# Patient Record
Sex: MALE | Race: WHITE | NOT HISPANIC OR LATINO | Employment: OTHER | URBAN - METROPOLITAN AREA
[De-identification: names, ages, dates, MRNs, and addresses within clinical notes are randomized per-mention and may not be internally consistent; named-entity substitution may affect disease eponyms.]

---

## 2019-04-02 ENCOUNTER — TELEPHONE (OUTPATIENT)
Dept: PRIMARY CARE CLINIC | Facility: CLINIC | Age: 74
End: 2019-04-02

## 2019-04-02 NOTE — TELEPHONE ENCOUNTER
----- Message from Jacquelin Weber sent at 3/28/2019 11:16 AM CDT -----  Contact: Scarlett (wife)  Pt is requesting steroid pack, Shivani cough pills, and z-pac sent to [facility] in [place] [phone] is Scarlett's phone number

## 2019-04-02 NOTE — TELEPHONE ENCOUNTER
I am just now receiving this message.  Please call him and see if he went to an urgent care or if his symptoms improved.  Please apologize and let them know that the message was taken but was lost in the system.  If he is still having symptoms I can call out to medication, but if he is getting better then he does not need to take a medication and it would be better if he did not take it.

## 2019-04-25 ENCOUNTER — TELEPHONE (OUTPATIENT)
Dept: PRIMARY CARE CLINIC | Facility: CLINIC | Age: 74
End: 2019-04-25

## 2019-04-25 DIAGNOSIS — I10 ESSENTIAL HYPERTENSION: Primary | ICD-10-CM

## 2019-04-25 RX ORDER — LOSARTAN POTASSIUM AND HYDROCHLOROTHIAZIDE 25; 100 MG/1; MG/1
TABLET ORAL
COMMUNITY
End: 2019-04-25 | Stop reason: SDUPTHER

## 2019-04-25 RX ORDER — LOSARTAN POTASSIUM AND HYDROCHLOROTHIAZIDE 25; 100 MG/1; MG/1
TABLET ORAL
Qty: 90 TABLET | Refills: 3 | Status: SHIPPED | OUTPATIENT
Start: 2019-04-25

## 2019-04-29 ENCOUNTER — OFFICE VISIT (OUTPATIENT)
Dept: PRIMARY CARE CLINIC | Facility: CLINIC | Age: 74
End: 2019-04-29
Payer: MEDICARE

## 2019-04-29 VITALS
HEIGHT: 71 IN | OXYGEN SATURATION: 97 % | BODY MASS INDEX: 33.04 KG/M2 | WEIGHT: 236 LBS | HEART RATE: 71 BPM | RESPIRATION RATE: 16 BRPM | SYSTOLIC BLOOD PRESSURE: 120 MMHG | DIASTOLIC BLOOD PRESSURE: 70 MMHG

## 2019-04-29 DIAGNOSIS — E78.2 MIXED HYPERLIPIDEMIA: ICD-10-CM

## 2019-04-29 DIAGNOSIS — R05.9 COUGH: ICD-10-CM

## 2019-04-29 DIAGNOSIS — K21.9 GASTROESOPHAGEAL REFLUX DISEASE, ESOPHAGITIS PRESENCE NOT SPECIFIED: ICD-10-CM

## 2019-04-29 DIAGNOSIS — I10 BENIGN ESSENTIAL HYPERTENSION: Primary | ICD-10-CM

## 2019-04-29 PROBLEM — C44.320 SQUAMOUS CELL CARCINOMA OF SKIN OF FACE: Status: ACTIVE | Noted: 2019-04-29

## 2019-04-29 PROBLEM — N52.9 ED (ERECTILE DYSFUNCTION) OF ORGANIC ORIGIN: Status: ACTIVE | Noted: 2019-04-29

## 2019-04-29 PROBLEM — R42 VERTIGO: Status: ACTIVE | Noted: 2019-04-29

## 2019-04-29 PROBLEM — F41.1 ANXIETY STATE: Status: ACTIVE | Noted: 2019-04-29

## 2019-04-29 PROBLEM — A04.8 HELICOBACTER PYLORI GASTROINTESTINAL TRACT INFECTION: Status: ACTIVE | Noted: 2019-04-29

## 2019-04-29 PROBLEM — Z95.0 CARDIAC PACEMAKER IN SITU: Status: ACTIVE | Noted: 2019-04-29

## 2019-04-29 PROBLEM — I11.9 HYPERTENSIVE HEART DISEASE WITHOUT CONGESTIVE HEART FAILURE: Status: ACTIVE | Noted: 2019-04-29

## 2019-04-29 PROBLEM — E29.1 TESTICULAR HYPOFUNCTION: Status: ACTIVE | Noted: 2019-04-29

## 2019-04-29 PROBLEM — K57.30 DIVERTICULAR DISEASE OF COLON: Status: ACTIVE | Noted: 2019-04-29

## 2019-04-29 PROCEDURE — 99214 PR OFFICE/OUTPT VISIT, EST, LEVL IV, 30-39 MIN: ICD-10-PCS | Mod: S$GLB,,, | Performed by: FAMILY MEDICINE

## 2019-04-29 PROCEDURE — 99214 OFFICE O/P EST MOD 30 MIN: CPT | Mod: S$GLB,,, | Performed by: FAMILY MEDICINE

## 2019-04-29 RX ORDER — DIPHENOXYLATE HYDROCHLORIDE AND ATROPINE SULFATE 2.5; .025 MG/1; MG/1
TABLET ORAL
Refills: 0 | COMMUNITY
Start: 2019-03-11

## 2019-04-29 RX ORDER — METOPROLOL SUCCINATE 200 MG/1
TABLET, EXTENDED RELEASE ORAL
COMMUNITY

## 2019-04-29 RX ORDER — APIXABAN 5 MG/1
TABLET, FILM COATED ORAL
COMMUNITY
Start: 2019-04-28 | End: 2022-02-24

## 2019-04-29 RX ORDER — ALBUTEROL SULFATE 90 UG/1
AEROSOL, METERED RESPIRATORY (INHALATION)
COMMUNITY

## 2019-04-29 RX ORDER — LEVOFLOXACIN 500 MG/1
TABLET, FILM COATED ORAL
COMMUNITY
Start: 2019-04-09 | End: 2020-03-04

## 2019-04-29 RX ORDER — ASPIRIN 81 MG/1
81 TABLET ORAL DAILY
COMMUNITY

## 2019-04-29 RX ORDER — LABETALOL 200 MG/1
400 TABLET, FILM COATED ORAL 2 TIMES DAILY
Refills: 5 | COMMUNITY
Start: 2019-04-07

## 2019-04-29 RX ORDER — IPRATROPIUM BROMIDE 42 UG/1
SPRAY, METERED NASAL
COMMUNITY

## 2019-04-29 RX ORDER — AZELASTINE 1 MG/ML
SPRAY, METERED NASAL
COMMUNITY

## 2019-04-29 RX ORDER — GABAPENTIN 600 MG/1
TABLET ORAL
COMMUNITY

## 2019-04-29 RX ORDER — CITALOPRAM 10 MG/1
TABLET ORAL
COMMUNITY
Start: 2019-04-01 | End: 2022-02-24

## 2019-04-29 RX ORDER — CLONIDINE HYDROCHLORIDE 0.1 MG/1
TABLET ORAL
Refills: 0 | COMMUNITY
Start: 2019-04-07

## 2019-04-29 RX ORDER — CITALOPRAM 10 MG/1
10 TABLET ORAL DAILY
COMMUNITY
End: 2022-02-24 | Stop reason: SDUPTHER

## 2019-04-29 RX ORDER — BUDESONIDE AND FORMOTEROL FUMARATE DIHYDRATE 160; 4.5 UG/1; UG/1
AEROSOL RESPIRATORY (INHALATION)
COMMUNITY

## 2019-04-29 RX ORDER — AMLODIPINE BESYLATE 5 MG/1
TABLET ORAL
COMMUNITY
Start: 2019-02-17

## 2019-04-29 NOTE — PROGRESS NOTES
"Subjective:       Patient ID: Heather Andrea is a 73 y.o. male.    Chief Complaint: Hospital Follow Up (He went to [facility] for Hypertension) and cough (States he is still coughing when he lays down. He was given levaquin a " few weeks ago" to help with this .He said this has helped. )    Patient presents for follow-up on chronic conditions.  History of hypertension, which had been  well controlled on medications without side effects until a month ago.  Cardio changed his losartan to something else. He went to er and admitted. Put him labetalol 200mg bid, but this caused orthostatic hypotension.  This has now been halved.  They also found him in a. fib for 3.5 hours and he was placed on eliquis.  He denies any se's, but is concerned about taking this med.    Also with history of mixed hyperlipidemia.  Well controlled on medication without side effects.  Also with history of GERD.  This has been well controlled on medications without side effects as well.        Review of Systems   Constitutional: Negative for chills, fatigue, fever and unexpected weight change.   HENT: Positive for congestion and postnasal drip.    Eyes: Negative for visual disturbance.   Respiratory: Positive for cough. Negative for shortness of breath.    Cardiovascular: Negative for chest pain, palpitations and leg swelling.   Gastrointestinal: Negative for abdominal pain, blood in stool, constipation, diarrhea, nausea and vomiting.   Genitourinary: Negative for dysuria and hematuria.   Musculoskeletal: Negative for arthralgias and back pain.   Skin: Negative for rash and wound.   Neurological: Negative for dizziness, tremors, syncope, weakness, light-headedness, numbness and headaches.   Hematological: Negative for adenopathy. Does not bruise/bleed easily.   Psychiatric/Behavioral: Negative for dysphoric mood. The patient is not nervous/anxious.        Objective:      Physical Exam   Constitutional: He appears well-developed and " well-nourished.   HENT:   Head: Normocephalic and atraumatic.   Bilateral TMs are normal.  There is postnasal drip.  Nose is edematous with clear discharge.   Eyes: Conjunctivae and EOM are normal.   Neck: Neck supple. No thyromegaly present.   Cardiovascular: Normal rate, regular rhythm, normal heart sounds and intact distal pulses.   No murmur heard.  Pulmonary/Chest: Effort normal and breath sounds normal.   Abdominal: Soft. Bowel sounds are normal. He exhibits no mass. There is no tenderness. There is no rebound and no guarding.   Musculoskeletal: Normal range of motion.   Neurological: He is alert.   Skin: Skin is dry.   Psychiatric: He has a normal mood and affect. His behavior is normal.   Vitals reviewed.      Assessment:       1. Benign essential hypertension    2. Mixed hyperlipidemia    3. Gastroesophageal reflux disease, esophagitis presence not specified    4. Cough        Plan:       Benign essential hypertension    Mixed hyperlipidemia    Gastroesophageal reflux disease, esophagitis presence not specified    Cough              DISCUSSION:  Cont meds.  Monitor bp.  Start singulair, claritin, and flonase.

## 2019-09-25 ENCOUNTER — OFFICE VISIT (OUTPATIENT)
Dept: PRIMARY CARE CLINIC | Facility: CLINIC | Age: 74
End: 2019-09-25
Payer: MEDICARE

## 2019-09-25 VITALS
BODY MASS INDEX: 33.04 KG/M2 | HEIGHT: 71 IN | RESPIRATION RATE: 14 BRPM | WEIGHT: 236 LBS | DIASTOLIC BLOOD PRESSURE: 76 MMHG | SYSTOLIC BLOOD PRESSURE: 110 MMHG | OXYGEN SATURATION: 95 % | HEART RATE: 67 BPM

## 2019-09-25 DIAGNOSIS — J44.9 CHRONIC OBSTRUCTIVE PULMONARY DISEASE, UNSPECIFIED COPD TYPE: ICD-10-CM

## 2019-09-25 DIAGNOSIS — J40 BRONCHITIS: Primary | ICD-10-CM

## 2019-09-25 PROCEDURE — 99214 OFFICE O/P EST MOD 30 MIN: CPT | Mod: S$GLB,,, | Performed by: FAMILY MEDICINE

## 2019-09-25 PROCEDURE — 99214 PR OFFICE/OUTPT VISIT, EST, LEVL IV, 30-39 MIN: ICD-10-PCS | Mod: S$GLB,,, | Performed by: FAMILY MEDICINE

## 2019-09-25 RX ORDER — LEVOFLOXACIN 500 MG/1
500 TABLET, FILM COATED ORAL DAILY
Qty: 10 TABLET | Refills: 0 | Status: SHIPPED | OUTPATIENT
Start: 2019-09-25 | End: 2019-09-25

## 2019-09-25 RX ORDER — ALBUTEROL SULFATE 90 UG/1
2 AEROSOL, METERED RESPIRATORY (INHALATION) EVERY 6 HOURS PRN
Qty: 1 INHALER | Refills: 1 | Status: SHIPPED | OUTPATIENT
Start: 2019-09-25

## 2019-09-25 RX ORDER — ALBUTEROL SULFATE 90 UG/1
2 AEROSOL, METERED RESPIRATORY (INHALATION) EVERY 6 HOURS PRN
Qty: 1 INHALER | Refills: 1 | Status: SHIPPED | OUTPATIENT
Start: 2019-09-25 | End: 2019-09-25

## 2019-09-25 RX ORDER — LEVOFLOXACIN 500 MG/1
500 TABLET, FILM COATED ORAL DAILY
Qty: 10 TABLET | Refills: 0 | Status: SHIPPED | OUTPATIENT
Start: 2019-09-25 | End: 2022-02-24

## 2019-09-25 RX ORDER — PROMETHAZINE HYDROCHLORIDE AND CODEINE PHOSPHATE 6.25; 1 MG/5ML; MG/5ML
5 SOLUTION ORAL EVERY 4 HOURS PRN
Qty: 118 ML | Refills: 0 | Status: SHIPPED | OUTPATIENT
Start: 2019-09-25 | End: 2019-10-05

## 2019-09-25 NOTE — PROGRESS NOTES
"Subjective:       Patient ID: Heather Andrea is a 74 y.o. male.    Chief Complaint: Cough (He has been coughing on and off for about two months. It is worse in the afternoons. Productive. " foamy clear to white colour." ); Sinus Problem (He has tried several medications OTC but they have not helped.Denies fever, nausea, or ear pain.  ); and Follow-up    Pt with copd presents with coughing and sinus congestion x 3 months.  It has been on and off but worsened over the last couple of weeks.  He denies any fever and chills.  No ill contacts.  He has been using his inhalers as recommended by his pulmonologist.  There is mild shortness of breath.  Over the last 2-3 weeks he has had much sinus congestion runny nose and his cough has been worse.    Review of Systems   Constitutional: Positive for fatigue. Negative for chills, fever and unexpected weight change.   HENT: Positive for congestion, postnasal drip and rhinorrhea.    Eyes: Negative for visual disturbance.   Respiratory: Positive for cough, shortness of breath and wheezing.    Cardiovascular: Negative for chest pain, palpitations and leg swelling.   Gastrointestinal: Negative for abdominal pain, blood in stool, constipation, diarrhea, nausea and vomiting.   Genitourinary: Negative for dysuria and hematuria.   Musculoskeletal: Negative for arthralgias and back pain.   Skin: Negative for rash and wound.   Neurological: Negative for dizziness, tremors, syncope, weakness, light-headedness, numbness and headaches.   Hematological: Negative for adenopathy. Does not bruise/bleed easily.   Psychiatric/Behavioral: Negative for dysphoric mood. The patient is not nervous/anxious.      Medication List with Changes/Refills   New Medications    ALBUTEROL (PROVENTIL HFA) 90 MCG/ACTUATION INHALER    Inhale 2 puffs into the lungs every 6 (six) hours as needed for Wheezing. Rescue    LEVOFLOXACIN (LEVAQUIN) 500 MG TABLET    Take 1 tablet (500 mg total) by mouth once daily.    " "PROMETHAZINE-CODEINE 6.25-10 MG/5 ML (PHENERGAN WITH CODEINE) 6.25-10 MG/5 ML SYRUP    Take 5 mLs by mouth every 4 (four) hours as needed for Cough.   Current Medications    ALBUTEROL (PROVENTIL/VENTOLIN HFA) 90 MCG/ACTUATION INHALER    ProAir HFA 90 mcg/actuation aerosol inhaler    AMLODIPINE (NORVASC) 5 MG TABLET        APIXABAN (ELIQUIS) 5 MG TAB    Take 5 mg by mouth 2 (two) times daily.    ASPIRIN (ECOTRIN) 81 MG EC TABLET    Take 81 mg by mouth once daily.    AZELASTINE (ASTELIN) 137 MCG (0.1 %) NASAL SPRAY    azelastine 137 mcg (0.1 %) nasal spray aerosol    BUDESONIDE-FORMOTEROL 160-4.5 MCG (SYMBICORT) 160-4.5 MCG/ACTUATION HFAA    Symbicort 160 mcg-4.5 mcg/actuation HFA aerosol inhaler   TAKE 2 PUFFS BY MOUTH TWICE DAILY    CITALOPRAM (CELEXA) 10 MG TABLET        CITALOPRAM (CELEXA) 10 MG TABLET    Take 10 mg by mouth once daily.    CLONIDINE (CATAPRES) 0.1 MG TABLET    TAKE 1 TABLET BY MOUTH EVERY 6 HOURS AS NEEDED FOR BLOOD PRESSURE >165/95    DIPHENOXYLATE-ATROPINE 2.5-0.025 MG (LOMOTIL) 2.5-0.025 MG PER TABLET    TAKE 1 TO 2 TABS BY MOUTH EVERY 6 HOURS AS NEEDED    ELIQUIS 5 MG TAB        GABAPENTIN (NEURONTIN) 600 MG TABLET    gabapentin 300 mg capsule    IPRATROPIUM (ATROVENT) 42 MCG (0.06 %) NASAL SPRAY    ipratropium bromide 42 mcg (0.06 %) nasal spray    LABETALOL (NORMODYNE) 200 MG TABLET    Take 400 mg by mouth 2 (two) times daily.    LEVOFLOXACIN (LEVAQUIN) 500 MG TABLET        LOSARTAN-HYDROCHLOROTHIAZIDE 100-25 MG (HYZAAR) 100-25 MG PER TABLET    1 po qd    METOPROLOL SUCCINATE (TOPROL-XL) 200 MG 24 HR TABLET    metoprolol succinate  mg tablet,extended release 24 hr    OMEGA 3-DHA-EPA-FISH OIL (FISH OIL) 100-160-1,000 MG CAP    Fish Oil   1200mg 2bid    UMECLIDINIUM-VILANTEROL (ANORO ELLIPTA) 62.5-25 MCG/ACTUATION DSDV    Anoro Ellipta 62.5 mcg-25 mcg/actuation powder for inhalation         Objective:      /76   Pulse 67   Resp 14   Ht 5' 11" (1.803 m)   Wt 107 kg (236 lb)   " "SpO2 95%   BMI 32.92 kg/m²   Estimated body mass index is 32.92 kg/m² as calculated from the following:    Height as of this encounter: 5' 11" (1.803 m).    Weight as of this encounter: 107 kg (236 lb).  Physical Exam   Constitutional: He appears well-developed and well-nourished.   HENT:   Head: Normocephalic and atraumatic.   Right Ear: External ear normal.   Left Ear: External ear normal.   Postnasal drip  Bilateral TMs were clear   Eyes: Conjunctivae and EOM are normal.   Neck: Neck supple. No thyromegaly present.   Cardiovascular: Normal rate, regular rhythm, normal heart sounds and intact distal pulses.   No murmur heard.  Pulmonary/Chest: Effort normal. He has wheezes.   Abdominal: Soft. Bowel sounds are normal. He exhibits no mass. There is no tenderness. There is no rebound and no guarding.   Musculoskeletal: Normal range of motion.   Neurological: He is alert.   Skin: Skin is dry.   Psychiatric: He has a normal mood and affect. His behavior is normal.   Vitals reviewed.      Assessment:       Problem List Items Addressed This Visit        Pulmonary    COPD (chronic obstructive pulmonary disease)      Other Visit Diagnoses     Bronchitis    -  Primary    Relevant Medications    levoFLOXacin (LEVAQUIN) 500 MG tablet    albuterol (PROVENTIL HFA) 90 mcg/actuation inhaler    promethazine-codeine 6.25-10 mg/5 ml (PHENERGAN WITH CODEINE) 6.25-10 mg/5 mL syrup    Other Relevant Orders    X-Ray Chest 1 View            Plan:       Bronchitis  -     X-Ray Chest 1 View; Future; Expected date: 09/25/2019  -     levoFLOXacin (LEVAQUIN) 500 MG tablet; Take 1 tablet (500 mg total) by mouth once daily.  Dispense: 10 tablet; Refill: 0  -     albuterol (PROVENTIL HFA) 90 mcg/actuation inhaler; Inhale 2 puffs into the lungs every 6 (six) hours as needed for Wheezing. Rescue  Dispense: 1 Inhaler; Refill: 1  -     promethazine-codeine 6.25-10 mg/5 ml (PHENERGAN WITH CODEINE) 6.25-10 mg/5 mL syrup; Take 5 mLs by mouth every 4 " (four) hours as needed for Cough.  Dispense: 118 mL; Refill: 0    Chronic obstructive pulmonary disease, unspecified COPD type              DISCUSSION:  Get chest x-ray and start Levaquin.  Give her prescription for albuterol to use as a rescue inhaler.  Continue all other inhalers.  Follow up with pulmonology if this is not better within 1 week.  To ER if worsens acutely.

## 2019-09-26 ENCOUNTER — TELEPHONE (OUTPATIENT)
Dept: PRIMARY CARE CLINIC | Facility: CLINIC | Age: 74
End: 2019-09-26

## 2019-09-26 NOTE — TELEPHONE ENCOUNTER
Pt called to advise his Levaquin was sent to the [facility] and it needs to be sent to the [facility] on [address]. He needs to start immediately due to bronchitis.       Please cancel this request. The wife came by and advised they have it at [facility].

## 2019-09-27 ENCOUNTER — TELEPHONE (OUTPATIENT)
Dept: PRIMARY CARE CLINIC | Facility: CLINIC | Age: 74
End: 2019-09-27

## 2019-09-27 NOTE — TELEPHONE ENCOUNTER
----- Message from Meet Isaac MD sent at 9/26/2019 12:03 PM CDT -----  Please let him know that his chest x-ray is normal.

## 2019-09-30 RX ORDER — CITALOPRAM 10 MG/1
TABLET ORAL
Qty: 90 TABLET | Refills: 3 | Status: SHIPPED | OUTPATIENT
Start: 2019-09-30 | End: 2020-09-28

## 2020-03-04 ENCOUNTER — OFFICE VISIT (OUTPATIENT)
Dept: PRIMARY CARE CLINIC | Facility: CLINIC | Age: 75
End: 2020-03-04
Payer: MEDICARE

## 2020-03-04 VITALS
BODY MASS INDEX: 33.74 KG/M2 | HEIGHT: 71 IN | OXYGEN SATURATION: 96 % | RESPIRATION RATE: 16 BRPM | DIASTOLIC BLOOD PRESSURE: 70 MMHG | SYSTOLIC BLOOD PRESSURE: 126 MMHG | WEIGHT: 241 LBS | HEART RATE: 78 BPM

## 2020-03-04 DIAGNOSIS — R05.9 COUGH: ICD-10-CM

## 2020-03-04 DIAGNOSIS — J32.9 SINUSITIS, UNSPECIFIED CHRONICITY, UNSPECIFIED LOCATION: Primary | ICD-10-CM

## 2020-03-04 PROCEDURE — 99214 PR OFFICE/OUTPT VISIT, EST, LEVL IV, 30-39 MIN: ICD-10-PCS | Mod: S$GLB,,, | Performed by: FAMILY MEDICINE

## 2020-03-04 PROCEDURE — 99214 OFFICE O/P EST MOD 30 MIN: CPT | Mod: S$GLB,,, | Performed by: FAMILY MEDICINE

## 2020-03-04 NOTE — PROGRESS NOTES
Subjective:       Patient ID: Heather Andrea is a 74 y.o. male.    Chief Complaint: Sinus Problem    Pt with cough and sinus congestion x 2 months.  Went to  in the beginning.  Given levaquin and cough syrup.  Flu was neg.  Improved for 3 weeks, then returned.  Went back to the .  Given amoxyl.  Flu neg again.  Improved greatly, then returned about 1 week ago.  bloodwork with cardio during this time was good.      Review of Systems   Constitutional: Positive for fatigue. Negative for chills, fever and unexpected weight change.   HENT: Positive for congestion, postnasal drip and sinus pressure.    Eyes: Negative for visual disturbance.   Respiratory: Positive for cough. Negative for shortness of breath.    Cardiovascular: Negative for chest pain, palpitations and leg swelling.   Gastrointestinal: Negative for abdominal pain, blood in stool, constipation, diarrhea, nausea and vomiting.   Genitourinary: Negative for dysuria and hematuria.   Musculoskeletal: Negative for arthralgias and back pain.   Skin: Negative for rash and wound.   Neurological: Negative for dizziness, tremors, syncope, weakness, light-headedness, numbness and headaches.   Hematological: Negative for adenopathy. Does not bruise/bleed easily.   Psychiatric/Behavioral: Negative for dysphoric mood. The patient is not nervous/anxious.      Medication List with Changes/Refills   Current Medications    ALBUTEROL (PROVENTIL/VENTOLIN HFA) 90 MCG/ACTUATION INHALER    ProAir HFA 90 mcg/actuation aerosol inhaler    ALBUTEROL (VENTOLIN HFA) 90 MCG/ACTUATION INHALER    Inhale 2 puffs into the lungs every 6 (six) hours as needed for Wheezing. Rescue    AMLODIPINE (NORVASC) 5 MG TABLET        APIXABAN (ELIQUIS) 5 MG TAB    Take 5 mg by mouth 2 (two) times daily.    ASPIRIN (ECOTRIN) 81 MG EC TABLET    Take 81 mg by mouth once daily.    AZELASTINE (ASTELIN) 137 MCG (0.1 %) NASAL SPRAY    azelastine 137 mcg (0.1 %) nasal spray aerosol    BUDESONIDE-FORMOTEROL  "160-4.5 MCG (SYMBICORT) 160-4.5 MCG/ACTUATION HFAA    Symbicort 160 mcg-4.5 mcg/actuation HFA aerosol inhaler   TAKE 2 PUFFS BY MOUTH TWICE DAILY    CITALOPRAM (CELEXA) 10 MG TABLET        CITALOPRAM (CELEXA) 10 MG TABLET    Take 10 mg by mouth once daily.    CITALOPRAM (CELEXA) 10 MG TABLET    TAKE 1 TABLET BY MOUTH  EVERY DAY    CLONIDINE (CATAPRES) 0.1 MG TABLET    TAKE 1 TABLET BY MOUTH EVERY 6 HOURS AS NEEDED FOR BLOOD PRESSURE >165/95    DIPHENOXYLATE-ATROPINE 2.5-0.025 MG (LOMOTIL) 2.5-0.025 MG PER TABLET    TAKE 1 TO 2 TABS BY MOUTH EVERY 6 HOURS AS NEEDED    ELIQUIS 5 MG TAB        GABAPENTIN (NEURONTIN) 600 MG TABLET    gabapentin 300 mg capsule    IPRATROPIUM (ATROVENT) 42 MCG (0.06 %) NASAL SPRAY    ipratropium bromide 42 mcg (0.06 %) nasal spray    LABETALOL (NORMODYNE) 200 MG TABLET    Take 400 mg by mouth 2 (two) times daily.    LEVOFLOXACIN (LEVAQUIN) 500 MG TABLET    Take 1 tablet (500 mg total) by mouth once daily.    LOSARTAN-HYDROCHLOROTHIAZIDE 100-25 MG (HYZAAR) 100-25 MG PER TABLET    1 po qd    METOPROLOL SUCCINATE (TOPROL-XL) 200 MG 24 HR TABLET    metoprolol succinate  mg tablet,extended release 24 hr    OMEGA 3-DHA-EPA-FISH OIL (FISH OIL) 100-160-1,000 MG CAP    Fish Oil   1200mg 2bid    UMECLIDINIUM-VILANTEROL (ANORO ELLIPTA) 62.5-25 MCG/ACTUATION DSDV    Anoro Ellipta 62.5 mcg-25 mcg/actuation powder for inhalation   Discontinued Medications    LEVOFLOXACIN (LEVAQUIN) 500 MG TABLET             Objective:      /70   Pulse 78   Resp 16   Ht 5' 11" (1.803 m)   Wt 109.3 kg (241 lb)   SpO2 96%   BMI 33.61 kg/m²   Estimated body mass index is 33.61 kg/m² as calculated from the following:    Height as of this encounter: 5' 11" (1.803 m).    Weight as of this encounter: 109.3 kg (241 lb).  Physical Exam   Constitutional: He appears well-developed and well-nourished.   HENT:   Head: Normocephalic and atraumatic.   Right Ear: External ear normal.   Left Ear: External ear normal. "   Postnasal drip and nasal edema.   Eyes: Pupils are equal, round, and reactive to light. Conjunctivae and EOM are normal.   Neck: Neck supple. No thyromegaly present.   Cardiovascular: Normal rate, regular rhythm, normal heart sounds and intact distal pulses.   No murmur heard.  Pulmonary/Chest: Effort normal. No stridor. No respiratory distress. He has no wheezes. He has no rales. He exhibits no tenderness.   Abdominal: Soft. Bowel sounds are normal. He exhibits no mass. There is no tenderness. There is no rebound and no guarding.   Musculoskeletal: Normal range of motion.   Neurological: He is alert.   Skin: Skin is dry.   Psychiatric: He has a normal mood and affect. His behavior is normal.   Vitals reviewed.      Assessment:       Problem List Items Addressed This Visit        Pulmonary    Cough    Relevant Orders    Ambulatory referral/consult to ENT    X-Ray Chest PA And Lateral      Other Visit Diagnoses     Sinusitis, unspecified chronicity, unspecified location    -  Primary    Relevant Orders    Ambulatory referral/consult to ENT            Plan:       Sinusitis, unspecified chronicity, unspecified location  -     Ambulatory referral/consult to ENT; Future; Expected date: 03/11/2020    Cough  -     Ambulatory referral/consult to ENT; Future; Expected date: 03/11/2020  -     X-Ray Chest PA And Lateral; Future; Expected date: 03/04/2020              DISCUSSION:  Get a chest x-ray.  Monitor symptoms.  Rest and fluids.  We will refer to ENT for further evaluation.  This is along period of time and frequent infections.

## 2020-03-05 ENCOUNTER — TELEPHONE (OUTPATIENT)
Dept: PRIMARY CARE CLINIC | Facility: CLINIC | Age: 75
End: 2020-03-05

## 2020-07-06 ENCOUNTER — OFFICE VISIT (OUTPATIENT)
Dept: PRIMARY CARE CLINIC | Facility: CLINIC | Age: 75
End: 2020-07-06
Payer: MEDICARE

## 2020-07-06 VITALS
BODY MASS INDEX: 32.34 KG/M2 | HEIGHT: 71 IN | HEART RATE: 88 BPM | OXYGEN SATURATION: 96 % | WEIGHT: 231 LBS | DIASTOLIC BLOOD PRESSURE: 80 MMHG | TEMPERATURE: 98 F | SYSTOLIC BLOOD PRESSURE: 134 MMHG | RESPIRATION RATE: 18 BRPM

## 2020-07-06 DIAGNOSIS — E78.2 MIXED HYPERLIPIDEMIA: ICD-10-CM

## 2020-07-06 DIAGNOSIS — M54.50 CHRONIC BILATERAL LOW BACK PAIN WITHOUT SCIATICA: ICD-10-CM

## 2020-07-06 DIAGNOSIS — G89.29 CHRONIC BILATERAL LOW BACK PAIN WITHOUT SCIATICA: ICD-10-CM

## 2020-07-06 DIAGNOSIS — I10 BENIGN ESSENTIAL HYPERTENSION: Primary | ICD-10-CM

## 2020-07-06 DIAGNOSIS — J44.9 CHRONIC OBSTRUCTIVE PULMONARY DISEASE, UNSPECIFIED COPD TYPE: ICD-10-CM

## 2020-07-06 DIAGNOSIS — N28.1 RENAL CYST: ICD-10-CM

## 2020-07-06 PROCEDURE — 99214 OFFICE O/P EST MOD 30 MIN: CPT | Mod: S$GLB,,, | Performed by: FAMILY MEDICINE

## 2020-07-06 PROCEDURE — 99214 PR OFFICE/OUTPT VISIT, EST, LEVL IV, 30-39 MIN: ICD-10-PCS | Mod: S$GLB,,, | Performed by: FAMILY MEDICINE

## 2020-07-06 NOTE — PROGRESS NOTES
Subjective:       Patient ID: Heather Andrea is a 74 y.o. male.    Chief Complaint: Follow-up    Patient presents for follow-up on his chronic conditions.  He has history of hypertension which has been well controlled on medications without side effects.  He sees cardiology.  His pacemaker has been good he says.  Also with mixed hyperlipidemia.  He does not take a statin medication for this.  Also with history of COPD.  This has been very stable.  He is on multiple inhalers and they are working well.  He sees pulmonology for this as well.  Also with chronic back pain.  He has gotten 1 epidural steroid injection and it has not helped yet.  He also had an injection for sciatica that helped greatly.  Seeing sanz.  Also with history of a renal cyst.  This was worked up by Urology again a few months ago and was told that it appeared benign.  He was told to follow-up in 1 year.      Review of Systems   Constitutional: Negative for chills, fatigue, fever and unexpected weight change.   Eyes: Negative for visual disturbance.   Respiratory: Negative for shortness of breath.    Cardiovascular: Negative for chest pain, palpitations and leg swelling.   Gastrointestinal: Negative for abdominal pain, blood in stool, constipation, diarrhea, nausea and vomiting.   Genitourinary: Negative for dysuria and hematuria.   Musculoskeletal: Positive for back pain. Negative for arthralgias.   Skin: Negative for rash and wound.   Neurological: Negative for dizziness, tremors, syncope, weakness, light-headedness, numbness and headaches.   Hematological: Negative for adenopathy. Does not bruise/bleed easily.   Psychiatric/Behavioral: Negative for dysphoric mood. The patient is not nervous/anxious.      Medication List with Changes/Refills   Current Medications    ALBUTEROL (PROVENTIL/VENTOLIN HFA) 90 MCG/ACTUATION INHALER    ProAir HFA 90 mcg/actuation aerosol inhaler    ALBUTEROL (VENTOLIN HFA) 90 MCG/ACTUATION INHALER    Inhale 2 puffs  "into the lungs every 6 (six) hours as needed for Wheezing. Rescue    AMLODIPINE (NORVASC) 5 MG TABLET        APIXABAN (ELIQUIS) 5 MG TAB    Take 5 mg by mouth 2 (two) times daily.    ASPIRIN (ECOTRIN) 81 MG EC TABLET    Take 81 mg by mouth once daily.    AZELASTINE (ASTELIN) 137 MCG (0.1 %) NASAL SPRAY    azelastine 137 mcg (0.1 %) nasal spray aerosol    BUDESONIDE-FORMOTEROL 160-4.5 MCG (SYMBICORT) 160-4.5 MCG/ACTUATION HFAA    Symbicort 160 mcg-4.5 mcg/actuation HFA aerosol inhaler   TAKE 2 PUFFS BY MOUTH TWICE DAILY    CITALOPRAM (CELEXA) 10 MG TABLET        CITALOPRAM (CELEXA) 10 MG TABLET    Take 10 mg by mouth once daily.    CITALOPRAM (CELEXA) 10 MG TABLET    TAKE 1 TABLET BY MOUTH  EVERY DAY    CLONIDINE (CATAPRES) 0.1 MG TABLET    TAKE 1 TABLET BY MOUTH EVERY 6 HOURS AS NEEDED FOR BLOOD PRESSURE >165/95    DIPHENOXYLATE-ATROPINE 2.5-0.025 MG (LOMOTIL) 2.5-0.025 MG PER TABLET    TAKE 1 TO 2 TABS BY MOUTH EVERY 6 HOURS AS NEEDED    ELIQUIS 5 MG TAB        GABAPENTIN (NEURONTIN) 600 MG TABLET    gabapentin 300 mg capsule    IPRATROPIUM (ATROVENT) 42 MCG (0.06 %) NASAL SPRAY    ipratropium bromide 42 mcg (0.06 %) nasal spray    LABETALOL (NORMODYNE) 200 MG TABLET    Take 400 mg by mouth 2 (two) times daily.    LEVOFLOXACIN (LEVAQUIN) 500 MG TABLET    Take 1 tablet (500 mg total) by mouth once daily.    LOSARTAN-HYDROCHLOROTHIAZIDE 100-25 MG (HYZAAR) 100-25 MG PER TABLET    1 po qd    METOPROLOL SUCCINATE (TOPROL-XL) 200 MG 24 HR TABLET    metoprolol succinate  mg tablet,extended release 24 hr    OMEGA 3-DHA-EPA-FISH OIL (FISH OIL) 100-160-1,000 MG CAP    Fish Oil   1200mg 2bid    UMECLIDINIUM-VILANTEROL (ANORO ELLIPTA) 62.5-25 MCG/ACTUATION DSDV    Anoro Ellipta 62.5 mcg-25 mcg/actuation powder for inhalation         Objective:      /80 (BP Location: Left arm, Patient Position: Sitting, BP Method: Large (Manual))   Pulse 88   Temp 97.5 °F (36.4 °C)   Resp 18   Ht 5' 11" (1.803 m)   Wt 104.8 kg " "(231 lb)   SpO2 96%   BMI 32.22 kg/m²   Estimated body mass index is 32.22 kg/m² as calculated from the following:    Height as of this encounter: 5' 11" (1.803 m).    Weight as of this encounter: 104.8 kg (231 lb).  Physical Exam  Vitals signs reviewed.   Constitutional:       Appearance: He is well-developed.   HENT:      Head: Normocephalic and atraumatic.   Eyes:      Conjunctiva/sclera: Conjunctivae normal.   Neck:      Musculoskeletal: Neck supple.      Thyroid: No thyromegaly.   Cardiovascular:      Rate and Rhythm: Normal rate and regular rhythm.      Heart sounds: Normal heart sounds. No murmur.   Pulmonary:      Effort: Pulmonary effort is normal.      Breath sounds: Normal breath sounds.   Abdominal:      General: Bowel sounds are normal.      Palpations: Abdomen is soft. There is no mass.      Tenderness: There is no abdominal tenderness. There is no guarding or rebound.   Musculoskeletal: Normal range of motion.   Skin:     General: Skin is dry.   Neurological:      Mental Status: He is alert and oriented to person, place, and time.   Psychiatric:         Mood and Affect: Mood normal.         Behavior: Behavior normal.         Thought Content: Thought content normal.         Judgment: Judgment normal.         Assessment:       Problem List Items Addressed This Visit        Pulmonary    COPD (chronic obstructive pulmonary disease)       Cardiac/Vascular    Benign essential hypertension - Primary    Mixed hyperlipidemia      Other Visit Diagnoses     Chronic bilateral low back pain without sciatica        Renal cyst                Plan:       Benign essential hypertension    Mixed hyperlipidemia    Chronic obstructive pulmonary disease, unspecified COPD type    Chronic bilateral low back pain without sciatica    Renal cyst              DISCUSSION:  Stable.  Labs look good.  Continue medications.  Continue to follow-up with all specialists.  Diet and exercise discussed in detail.    "

## 2021-07-08 ENCOUNTER — PATIENT OUTREACH (OUTPATIENT)
Dept: ADMINISTRATIVE | Facility: HOSPITAL | Age: 76
End: 2021-07-08

## 2021-07-26 PROBLEM — K63.5 COLON POLYPS: Status: ACTIVE | Noted: 2021-06-07

## 2021-08-23 ENCOUNTER — TELEPHONE (OUTPATIENT)
Dept: PRIMARY CARE CLINIC | Facility: CLINIC | Age: 76
End: 2021-08-23

## 2021-08-24 ENCOUNTER — IMMUNIZATION (OUTPATIENT)
Dept: HEMATOLOGY/ONCOLOGY | Facility: CLINIC | Age: 76
End: 2021-08-24
Payer: MEDICARE

## 2021-08-24 DIAGNOSIS — Z23 NEED FOR VACCINATION: Primary | ICD-10-CM

## 2021-08-24 PROCEDURE — 91300 COVID-19, MRNA, LNP-S, PF, 30 MCG/0.3 ML DOSE VACCINE: ICD-10-PCS | Mod: S$GLB,,, | Performed by: FAMILY MEDICINE

## 2021-08-24 PROCEDURE — 0001A COVID-19, MRNA, LNP-S, PF, 30 MCG/0.3 ML DOSE VACCINE: CPT | Mod: CV19,S$GLB,, | Performed by: FAMILY MEDICINE

## 2021-08-24 PROCEDURE — 91300 COVID-19, MRNA, LNP-S, PF, 30 MCG/0.3 ML DOSE VACCINE: CPT | Mod: S$GLB,,, | Performed by: FAMILY MEDICINE

## 2021-08-24 PROCEDURE — 0001A COVID-19, MRNA, LNP-S, PF, 30 MCG/0.3 ML DOSE VACCINE: ICD-10-PCS | Mod: CV19,S$GLB,, | Performed by: FAMILY MEDICINE

## 2021-09-17 ENCOUNTER — IMMUNIZATION (OUTPATIENT)
Dept: HEMATOLOGY/ONCOLOGY | Facility: CLINIC | Age: 76
End: 2021-09-17
Payer: MEDICARE

## 2021-09-17 DIAGNOSIS — Z23 NEED FOR VACCINATION: Primary | ICD-10-CM

## 2021-09-17 PROCEDURE — 0002A COVID-19, MRNA, LNP-S, PF, 30 MCG/0.3 ML DOSE VACCINE: CPT | Mod: CV19,S$GLB,, | Performed by: FAMILY MEDICINE

## 2021-09-17 PROCEDURE — 0002A COVID-19, MRNA, LNP-S, PF, 30 MCG/0.3 ML DOSE VACCINE: ICD-10-PCS | Mod: CV19,S$GLB,, | Performed by: FAMILY MEDICINE

## 2021-09-17 PROCEDURE — 91300 COVID-19, MRNA, LNP-S, PF, 30 MCG/0.3 ML DOSE VACCINE: ICD-10-PCS | Mod: S$GLB,,, | Performed by: FAMILY MEDICINE

## 2021-09-17 PROCEDURE — 91300 COVID-19, MRNA, LNP-S, PF, 30 MCG/0.3 ML DOSE VACCINE: CPT | Mod: S$GLB,,, | Performed by: FAMILY MEDICINE

## 2022-02-14 ENCOUNTER — TELEPHONE (OUTPATIENT)
Dept: PRIMARY CARE CLINIC | Facility: CLINIC | Age: 77
End: 2022-02-14
Payer: MEDICARE

## 2022-02-22 RX ORDER — CITALOPRAM 10 MG/1
10 TABLET ORAL DAILY
Qty: 90 TABLET | Refills: 3 | Status: SHIPPED | OUTPATIENT
Start: 2022-02-22 | End: 2022-02-24

## 2022-02-22 NOTE — TELEPHONE ENCOUNTER
----- Message from Monika Tony sent at 2/22/2022 10:23 AM CST -----  Contact: PT  .Type:  Sooner Appointment Request    Caller is requesting a sooner appointment.  Caller declined first available appointment listed below.  Caller will not accept being placed on the waitlist and is requesting a message be sent to doctor.  Name of Caller: Mrs Romero   When is the first available appointment? 03/14/2022  Symptoms:   Would the patient rather a call back or a response via [facility]?  Callback    Best Call Back Number: .[phone] (home)      Additional Information:

## 2022-02-24 ENCOUNTER — OFFICE VISIT (OUTPATIENT)
Dept: PRIMARY CARE CLINIC | Facility: CLINIC | Age: 77
End: 2022-02-24
Payer: MEDICARE

## 2022-02-24 VITALS
SYSTOLIC BLOOD PRESSURE: 138 MMHG | HEART RATE: 78 BPM | HEIGHT: 71 IN | OXYGEN SATURATION: 97 % | RESPIRATION RATE: 18 BRPM | WEIGHT: 232 LBS | DIASTOLIC BLOOD PRESSURE: 74 MMHG | BODY MASS INDEX: 32.48 KG/M2

## 2022-02-24 DIAGNOSIS — E78.2 MIXED HYPERLIPIDEMIA: ICD-10-CM

## 2022-02-24 DIAGNOSIS — J44.9 CHRONIC OBSTRUCTIVE PULMONARY DISEASE, UNSPECIFIED COPD TYPE: ICD-10-CM

## 2022-02-24 DIAGNOSIS — J45.909 ASTHMA, UNSPECIFIED ASTHMA SEVERITY, UNSPECIFIED WHETHER COMPLICATED, UNSPECIFIED WHETHER PERSISTENT: ICD-10-CM

## 2022-02-24 DIAGNOSIS — G89.29 CHRONIC BILATERAL LOW BACK PAIN WITHOUT SCIATICA: ICD-10-CM

## 2022-02-24 DIAGNOSIS — I10 BENIGN ESSENTIAL HYPERTENSION: Primary | ICD-10-CM

## 2022-02-24 DIAGNOSIS — I48.0 PAROXYSMAL ATRIAL FIBRILLATION: ICD-10-CM

## 2022-02-24 DIAGNOSIS — N28.1 RENAL CYST: ICD-10-CM

## 2022-02-24 DIAGNOSIS — F41.1 ANXIETY STATE: ICD-10-CM

## 2022-02-24 DIAGNOSIS — J32.9 SINUSITIS, UNSPECIFIED CHRONICITY, UNSPECIFIED LOCATION: ICD-10-CM

## 2022-02-24 DIAGNOSIS — M54.50 CHRONIC BILATERAL LOW BACK PAIN WITHOUT SCIATICA: ICD-10-CM

## 2022-02-24 PROCEDURE — 3075F PR MOST RECENT SYSTOLIC BLOOD PRESS GE 130-139MM HG: ICD-10-PCS | Mod: CPTII,S$GLB,, | Performed by: FAMILY MEDICINE

## 2022-02-24 PROCEDURE — 99214 OFFICE O/P EST MOD 30 MIN: CPT | Mod: S$GLB,,, | Performed by: FAMILY MEDICINE

## 2022-02-24 PROCEDURE — 1159F PR MEDICATION LIST DOCUMENTED IN MEDICAL RECORD: ICD-10-PCS | Mod: CPTII,S$GLB,, | Performed by: FAMILY MEDICINE

## 2022-02-24 PROCEDURE — 99214 PR OFFICE/OUTPT VISIT, EST, LEVL IV, 30-39 MIN: ICD-10-PCS | Mod: S$GLB,,, | Performed by: FAMILY MEDICINE

## 2022-02-24 PROCEDURE — 3078F DIAST BP <80 MM HG: CPT | Mod: CPTII,S$GLB,, | Performed by: FAMILY MEDICINE

## 2022-02-24 PROCEDURE — 1160F PR REVIEW ALL MEDS BY PRESCRIBER/CLIN PHARMACIST DOCUMENTED: ICD-10-PCS | Mod: CPTII,S$GLB,, | Performed by: FAMILY MEDICINE

## 2022-02-24 PROCEDURE — 3078F PR MOST RECENT DIASTOLIC BLOOD PRESSURE < 80 MM HG: ICD-10-PCS | Mod: CPTII,S$GLB,, | Performed by: FAMILY MEDICINE

## 2022-02-24 PROCEDURE — 1160F RVW MEDS BY RX/DR IN RCRD: CPT | Mod: CPTII,S$GLB,, | Performed by: FAMILY MEDICINE

## 2022-02-24 PROCEDURE — 1159F MED LIST DOCD IN RCRD: CPT | Mod: CPTII,S$GLB,, | Performed by: FAMILY MEDICINE

## 2022-02-24 PROCEDURE — 3075F SYST BP GE 130 - 139MM HG: CPT | Mod: CPTII,S$GLB,, | Performed by: FAMILY MEDICINE

## 2022-02-24 RX ORDER — CITALOPRAM 10 MG/1
10 TABLET ORAL DAILY
Qty: 90 TABLET | Refills: 3 | Status: SHIPPED | OUTPATIENT
Start: 2022-02-24

## 2022-02-24 RX ORDER — CITALOPRAM 10 MG/1
10 TABLET ORAL DAILY
Qty: 90 TABLET | Refills: 3 | Status: CANCELLED | OUTPATIENT
Start: 2022-02-24

## 2022-02-24 RX ORDER — GABAPENTIN 300 MG/1
600 CAPSULE ORAL NIGHTLY
Qty: 180 CAPSULE | Refills: 3 | Status: SHIPPED | OUTPATIENT
Start: 2022-02-24 | End: 2023-02-24

## 2022-02-24 RX ORDER — GABAPENTIN 600 MG/1
TABLET ORAL
Status: CANCELLED | OUTPATIENT
Start: 2022-02-24

## 2022-02-24 RX ORDER — AMOXICILLIN 500 MG/1
500 TABLET, FILM COATED ORAL 3 TIMES DAILY
Qty: 63 TABLET | Refills: 0 | Status: SHIPPED | OUTPATIENT
Start: 2022-02-24 | End: 2022-03-17

## 2022-02-24 RX ORDER — METHYLPREDNISOLONE 4 MG/1
TABLET ORAL
Qty: 1 EACH | Refills: 0 | Status: SHIPPED | OUTPATIENT
Start: 2022-02-24

## 2022-02-24 NOTE — PROGRESS NOTES
Subjective:       Patient ID: Heather Andrea is a 76 y.o. male.    Chief Complaint: Cough and Sinus Problem    Patient presents for follow-up on his chronic conditions and persistent and recurrent uri.  He goes to , given abx and it improves then returns.  cxr has been normal at the beginning of this with his pulmonologist.  bloodwork was normal at .  This has been for 4 months.  He has history of hypertension which has been well controlled on medications without side effects until recently.  He sees cardiology, last visit was 1 month ago.  His pacemaker has been good he says.  Also with paroxysmal atrial fibrillation.  This has been asymptomatic.  He is still on the Eliquis.  Also with mixed hyperlipidemia.  He does not take a statin medication for this.  Also with history of COPD.  This has been very stable.  He is on multiple inhalers and they are working well.  He sees pulmonology for this as well.  Also with chronic back pain.  He has gotten 1 epidural steroid injection and it has not helped yet.  He also had an injection for sciatica that helped greatly.  Seeing sanz.  Also with history of a renal cyst.  This was worked up by Urology and was told that it appeared benign.       Review of Systems   Constitutional: Negative for chills, fatigue, fever and unexpected weight change.   HENT: Positive for congestion, postnasal drip and sinus pressure.    Eyes: Negative for visual disturbance.   Respiratory: Positive for cough. Negative for shortness of breath.    Cardiovascular: Negative for chest pain, palpitations and leg swelling.   Gastrointestinal: Negative for abdominal pain, blood in stool, constipation, diarrhea, nausea and vomiting.   Genitourinary: Negative for dysuria and hematuria.   Musculoskeletal: Positive for back pain. Negative for arthralgias.   Skin: Negative for rash and wound.   Neurological: Negative for dizziness, tremors, syncope, weakness, light-headedness, numbness and headaches.    Hematological: Negative for adenopathy. Does not bruise/bleed easily.   Psychiatric/Behavioral: Negative for dysphoric mood. The patient is not nervous/anxious.      Medication List with Changes/Refills   New Medications    AMOXICILLIN (AMOXIL) 500 MG TAB    Take 1 tablet (500 mg total) by mouth 3 (three) times daily. for 21 days    GABAPENTIN (NEURONTIN) 300 MG CAPSULE    Take 2 capsules (600 mg total) by mouth every evening.    METHYLPREDNISOLONE (MEDROL DOSEPACK) 4 MG TABLET    use as directed   Current Medications    ALBUTEROL (PROVENTIL/VENTOLIN HFA) 90 MCG/ACTUATION INHALER    ProAir HFA 90 mcg/actuation aerosol inhaler    ALBUTEROL (VENTOLIN HFA) 90 MCG/ACTUATION INHALER    Inhale 2 puffs into the lungs every 6 (six) hours as needed for Wheezing. Rescue    AMLODIPINE (NORVASC) 5 MG TABLET        APIXABAN (ELIQUIS) 5 MG TAB    Take 2.5 mg by mouth 2 (two) times daily.    ASPIRIN (ECOTRIN) 81 MG EC TABLET    Take 81 mg by mouth once daily.    AZELASTINE (ASTELIN) 137 MCG (0.1 %) NASAL SPRAY    azelastine 137 mcg (0.1 %) nasal spray aerosol    BUDESONIDE-FORMOTEROL 160-4.5 MCG (SYMBICORT) 160-4.5 MCG/ACTUATION HFAA    Symbicort 160 mcg-4.5 mcg/actuation HFA aerosol inhaler   TAKE 2 PUFFS BY MOUTH TWICE DAILY    CLONIDINE (CATAPRES) 0.1 MG TABLET    TAKE 1 TABLET BY MOUTH EVERY 6 HOURS AS NEEDED FOR BLOOD PRESSURE >165/95    DIPHENOXYLATE-ATROPINE 2.5-0.025 MG (LOMOTIL) 2.5-0.025 MG PER TABLET    TAKE 1 TO 2 TABS BY MOUTH EVERY 6 HOURS AS NEEDED    GABAPENTIN (NEURONTIN) 600 MG TABLET    gabapentin 300 mg capsule    IPRATROPIUM (ATROVENT) 42 MCG (0.06 %) NASAL SPRAY    ipratropium bromide 42 mcg (0.06 %) nasal spray    LABETALOL (NORMODYNE) 200 MG TABLET    Take 400 mg by mouth 2 (two) times daily.    LOSARTAN-HYDROCHLOROTHIAZIDE 100-25 MG (HYZAAR) 100-25 MG PER TABLET    1 po qd    METOPROLOL SUCCINATE (TOPROL-XL) 200 MG 24 HR TABLET    metoprolol succinate  mg tablet,extended release 24 hr    OMEGA  "3-DHA-EPA-FISH -160-1,000 MG CAP    Fish Oil   1200mg 2bid    UMECLIDINIUM-VILANTEROL (ANORO ELLIPTA) 62.5-25 MCG/ACTUATION DSDV    Anoro Ellipta 62.5 mcg-25 mcg/actuation powder for inhalation   Changed and/or Refilled Medications    Modified Medication Previous Medication    CITALOPRAM (CELEXA) 10 MG TABLET citalopram (CELEXA) 10 MG tablet       Take 1 tablet (10 mg total) by mouth once daily.    Take 10 mg by mouth once daily.   Discontinued Medications    CITALOPRAM (CELEXA) 10 MG TABLET        CITALOPRAM (CELEXA) 10 MG TABLET    TAKE 1 TABLET BY MOUTH  EVERY DAY    CITALOPRAM (CELEXA) 10 MG TABLET    Take 1 tablet (10 mg total) by mouth once daily.    ELIQUIS 5 MG TAB        LEVOFLOXACIN (LEVAQUIN) 500 MG TABLET    Take 1 tablet (500 mg total) by mouth once daily.         Objective:      /74 (BP Location: Left arm, Patient Position: Sitting, BP Method: Large (Manual))   Pulse 78   Resp 18   Ht 5' 11" (1.803 m)   Wt 105.2 kg (232 lb)   SpO2 97%   BMI 32.36 kg/m²   Estimated body mass index is 32.36 kg/m² as calculated from the following:    Height as of this encounter: 5' 11" (1.803 m).    Weight as of this encounter: 105.2 kg (232 lb).  Physical Exam  Vitals reviewed.   Constitutional:       General: He is not in acute distress.     Appearance: Normal appearance. He is well-developed. He is obese. He is not ill-appearing.   HENT:      Head: Normocephalic and atraumatic.   Eyes:      Conjunctiva/sclera: Conjunctivae normal.   Neck:      Thyroid: No thyromegaly.   Cardiovascular:      Rate and Rhythm: Normal rate. Rhythm irregular.      Heart sounds: Normal heart sounds. No murmur heard.  Pulmonary:      Effort: Pulmonary effort is normal.      Breath sounds: Normal breath sounds.   Abdominal:      General: Bowel sounds are normal.      Palpations: Abdomen is soft. There is no mass.      Tenderness: There is no abdominal tenderness. There is no guarding or rebound.   Musculoskeletal:         " General: Normal range of motion.      Cervical back: Neck supple.   Skin:     General: Skin is warm and dry.   Neurological:      General: No focal deficit present.      Mental Status: He is alert and oriented to person, place, and time.   Psychiatric:         Mood and Affect: Mood normal.         Behavior: Behavior normal.         Thought Content: Thought content normal.         Judgment: Judgment normal.         Assessment:       Problem List Items Addressed This Visit        Psychiatric    Anxiety state    Relevant Medications    citalopram (CELEXA) 10 MG tablet       Pulmonary    Asthma    Chronic obstructive pulmonary disease       Cardiac/Vascular    Benign essential hypertension - Primary    Mixed hyperlipidemia    Paroxysmal atrial fibrillation       Renal/    Renal cyst       Orthopedic    Chronic bilateral low back pain without sciatica    Relevant Medications    gabapentin (NEURONTIN) 300 MG capsule      Other Visit Diagnoses     Sinusitis, unspecified chronicity, unspecified location        Relevant Medications    methylPREDNISolone (MEDROL DOSEPACK) 4 mg tablet    amoxicillin (AMOXIL) 500 MG Tab    Other Relevant Orders    Ambulatory referral/consult to ENT            Plan:       Benign essential hypertension    Mixed hyperlipidemia    Chronic obstructive pulmonary disease, unspecified COPD type    Chronic bilateral low back pain without sciatica  -     gabapentin (NEURONTIN) 300 MG capsule; Take 2 capsules (600 mg total) by mouth every evening.  Dispense: 180 capsule; Refill: 3    Renal cyst    Paroxysmal atrial fibrillation    Sinusitis, unspecified chronicity, unspecified location  -     methylPREDNISolone (MEDROL DOSEPACK) 4 mg tablet; use as directed  Dispense: 1 each; Refill: 0  -     amoxicillin (AMOXIL) 500 MG Tab; Take 1 tablet (500 mg total) by mouth 3 (three) times daily. for 21 days  Dispense: 63 tablet; Refill: 0  -     Ambulatory referral/consult to ENT; Future; Expected date:  03/03/2022    Anxiety state  -     citalopram (CELEXA) 10 MG tablet; Take 1 tablet (10 mg total) by mouth once daily.  Dispense: 90 tablet; Refill: 3    Asthma, unspecified asthma severity, unspecified whether complicated, unspecified whether persistent              DISCUSSION:  Get labs in a couple months.  Continue medications.  Will treat for sinusitis with long-term amoxicillin and a steroid course.  Refilled medications.  Refer to ENT.  Continue to follow-up with all specialists.  Diet and exercise discussed in detail.

## 2022-05-03 ENCOUNTER — TELEPHONE (OUTPATIENT)
Dept: GASTROENTEROLOGY | Facility: CLINIC | Age: 77
End: 2022-05-03
Payer: MEDICARE

## 2022-05-03 NOTE — TELEPHONE ENCOUNTER
----- Message from Hoda Pollock sent at 5/3/2022 10:33 AM CDT -----  Heather Andrea stated that he received a letter in the mail stating that he's due for a colonoscopy. He said he called yesterday and haven't heard back from anyone yet.   [phone]

## 2022-05-03 NOTE — TELEPHONE ENCOUNTER
Spoke with patient he stated he received a letter to schedule his colonoscopy. He is not due for colon recheck until 6/7/2024. He also wanted his results from last colonoscopy as well. I went over those with him he voiced understanding.-JAUN

## 2022-08-22 ENCOUNTER — TELEPHONE (OUTPATIENT)
Dept: PRIMARY CARE CLINIC | Facility: CLINIC | Age: 77
End: 2022-08-22
Payer: MEDICARE

## 2022-08-22 DIAGNOSIS — U07.1 COVID: Primary | ICD-10-CM

## 2022-08-22 NOTE — TELEPHONE ENCOUNTER
Pt wife states that the pt tested positive for Covid this morning at Urgent Care and was prescribed levaquin and Tessalon Pearls and she is also giving him tylenol as well. Pt wife states the pt had a fever of 102.7 this morning. Pt wife is calling to see if Dr Isaac would like to order the pt a Steroid or the infusion or the new medication for Covid and If so send the medication to [facility] in [place]  .                        ----- Message from Bisi Don sent at 8/22/2022  3:17 PM CDT -----  Contact: pt wife  Pt wife returning a missed call and can be reached at [phone]    Thanks,

## 2022-08-22 NOTE — TELEPHONE ENCOUNTER
Called and LVM                  ----- Message from Celine Gregory MA sent at 8/22/2022  9:26 AM CDT -----  Contact: self    ----- Message -----  From: Bisi Don  Sent: 8/22/2022   9:25 AM CDT  To: [facility]cho Dsouza Staff    Pt wife Scarlett calling stating  test positive for covid in urgent care today and has to quarantine for 5 days. They want pt to come back to retest again. Pt has appt  with Poncho on 8/24/2022.  Pt wife can be reached at [phone]. She also wanted to know if pt needs to get infusion at the hospital at [facility].      Thanks,

## 2022-08-23 ENCOUNTER — TELEPHONE (OUTPATIENT)
Dept: PRIMARY CARE CLINIC | Facility: CLINIC | Age: 77
End: 2022-08-23
Payer: MEDICARE

## 2022-08-23 RX ORDER — METHYLPREDNISOLONE 4 MG/1
TABLET ORAL
Qty: 1 EACH | Refills: 0 | Status: SHIPPED | OUTPATIENT
Start: 2022-08-23

## 2022-08-23 NOTE — TELEPHONE ENCOUNTER
Please let her know that I sent a Medrol Dosepak in.  He has prednisone listed as an allergy, but he took a Medrol Dosepak 6 months ago and I assume that he did not have any problems with this.  Please confirm with him that he was able to take that steroid without an allergic reaction.  Also let her know that he cannot take the new medication for COVID because of the multiple drug interactions that it has with his medicines.  Rest and fluids and monitor his breathing.  If he worsens at all he is to go to the emergency room to be re-evaluated.

## 2022-08-23 NOTE — TELEPHONE ENCOUNTER
Advised the pt that when I get feed back from Dr Isaac I will give him a call              ----- Message from Sera Staples sent at 8/23/2022 10:35 AM CDT -----  Contact: Mrs Andrea  Patient's wife is calling again, checking on status of having something called out for her  who is Covid-positive. Please call back at [phone]

## 2023-03-27 ENCOUNTER — TELEPHONE (OUTPATIENT)
Dept: GASTROENTEROLOGY | Facility: CLINIC | Age: 78
End: 2023-03-27
Payer: MEDICARE

## 2023-03-27 NOTE — TELEPHONE ENCOUNTER
----- Message from Ashanti Everett sent at 3/27/2023  8:20 AM CDT -----  Regarding: appt access  Contact: pt  Pt is calling to schedule a colonoscopy with Dr Kinney. Please call back at [phone]//thank you acc

## 2023-03-27 NOTE — TELEPHONE ENCOUNTER
Detail Level: Generalized Returned call to pt and let him know that he is not due and I was trying to get him scheduled w/ [facility] on 6/8/ pt didn't want that date and he states he stool is not full and he is having some problems and he was going to have his doctor call over and he was going to call back in the morning

## 2023-06-26 ENCOUNTER — TELEPHONE (OUTPATIENT)
Dept: GASTROENTEROLOGY | Facility: CLINIC | Age: 78
End: 2023-06-26
Payer: MEDICARE

## 2023-06-26 NOTE — TELEPHONE ENCOUNTER
----- Message from Cinthia Gilbert MA sent at 6/26/2023 12:29 PM CDT -----    ----- Message -----  From: Lilian Alicia  Sent: 6/26/2023  11:11 AM CDT  To: Nirmal HAMILTON Staff    Patient is regards to his yearly colonoscopy still waiting to be scheduled...Please call him back at [phone]..

## 2023-06-26 NOTE — TELEPHONE ENCOUNTER
Returned callers call and l/m for him to letting him know that he is not due tell 6/7/2024  Last procedure was egd -colonoscopy 6/7/2021  Every 3 yrs

## 2024-06-06 ENCOUNTER — TELEPHONE (OUTPATIENT)
Dept: GASTROENTEROLOGY | Facility: CLINIC | Age: 79
End: 2024-06-06
Payer: MEDICARE

## 2024-06-06 NOTE — TELEPHONE ENCOUNTER
----- Message from Charlotte Dominguez sent at 6/6/2024  2:25 PM CDT -----  Contact: self  Patient requesting a call back regarding getting scheduled for a colonoscopy. Please call back @ [phone]

## 2024-06-06 NOTE — TELEPHONE ENCOUNTER
Patient's last colonoscopy was w/ [facility] on 6/7/2021. Due 6/7/2024. Patient has medicare insurance and have not been seen in the last 6 months. OV for colonoscopy consult scheduled w/ [facility] on Wednesday June 26, 2024 at 9:00 am. DMP

## 2024-06-25 NOTE — PROGRESS NOTES
Clinic Note    Reason for visit:  The primary encounter diagnosis was Gastric intestinal metaplasia. Diagnoses of Rectal bleeding, History of colon polyps, Current use of long term anticoagulation, and Screening for malignant neoplasm of colon were also pertinent to this visit.    PCP: Isac Sampson       HPI:  This is a 78 y.o. male who is established. Presents with wife. Here to discuss repeat colonoscopy. He states this morning he had episode of large amount of bright red blood when having a bowel movement.  Describes as a very large amount in the toilet.  He has not had another episodes today but is having lower abdominal cramping currently.  Describes pain as mild but feels like he needs to have a bowel movement again.  He has never had blood in the stool before.  He does state he has been more constipated in the past month.  Has BM every 1-2 days. He does admit to straining.  He denies any knowledge of hemorrhoids.    Dr. Fuentes is cardiologist on eliquis.     EGD/Colonoscopy 6/7/2021 DBx regen/ero, GBx react, EBx reflux, 4 polyps: 2 TA, 1 hpy, diverticulosis, small IH/EH repeat colon 3y.    H/o GIM 2017. Gastric mapping 2018-nl    Review of Systems   Constitutional:  Negative for diaphoresis, fatigue, fever and unexpected weight change.   HENT:  Negative for hearing loss, mouth sores, postnasal drip, sore throat and trouble swallowing.    Eyes:  Negative for pain, discharge and eye dryness.   Respiratory:  Negative for apnea, cough, choking, chest tightness, shortness of breath and wheezing.    Cardiovascular:  Negative for chest pain, palpitations and leg swelling.   Gastrointestinal:  Negative for abdominal distention, abdominal pain, anal bleeding, blood in stool, change in bowel habit, constipation, diarrhea, nausea, rectal pain, vomiting, reflux and fecal incontinence.   Genitourinary:  Negative for bladder incontinence, dysuria and hematuria.   Musculoskeletal:  Negative for arthralgias, back pain and  joint swelling.   Integumentary:  Negative for color change and rash.   Allergic/Immunologic: Negative for environmental allergies and food allergies.   Neurological:  Negative for seizures and headaches.   Hematological:  Negative for adenopathy. Does not bruise/bleed easily.        Past Medical History:   Diagnosis Date    COPD (chronic obstructive pulmonary disease)     Essential (primary) hypertension     HTN (hypertension)     Neuropathy     Sick sinus syndrome      Past Surgical History:   Procedure Laterality Date    INSERTION OF PACEMAKER      2016    LEFT HEART CATHETERIZATION  2005     Family History   Problem Relation Name Age of Onset    Hodgkin's lymphoma Mother      Cancer Father      Cirrhosis Brother      Dementia Brother      Colon cancer Neg Hx      Crohn's disease Neg Hx      Esophageal cancer Neg Hx      Irritable bowel syndrome Neg Hx      Liver cancer Neg Hx      Liver disease Neg Hx      Rectal cancer Neg Hx      Stomach cancer Neg Hx       Social History     Tobacco Use    Smoking status: Every Day     Current packs/day: 1.00     Types: Cigarettes    Smokeless tobacco: Never   Substance Use Topics    Alcohol use: Not Currently    Drug use: Never     Review of patient's allergies indicates:   Allergen Reactions    Decongest multi-action     Prednisone (bulk) Other (See Comments)     Increased blood pressure       Medication List with Changes/Refills   New Medications    SOD SULF-POT CHLORIDE-MAG SULF (SUTAB) 1.479-0.188- 0.225 GRAM TABLET    Take according to package instructions with indicated amount of water. No breakfast day before test. May substitute with Suprep, Clenpiq, Plenvu, Moviprep or GoLytely based on Rx plan and patient preference.   Current Medications    AMLODIPINE (NORVASC) 5 MG TABLET        APIXABAN (ELIQUIS) 5 MG TAB    Take 2.5 mg by mouth 2 (two) times daily.    GABAPENTIN (NEURONTIN) 600 MG TABLET    gabapentin 300 mg capsule    LABETALOL (NORMODYNE) 200 MG TABLET     "Take 400 mg by mouth 2 (two) times daily.    LOSARTAN-HYDROCHLOROTHIAZIDE 100-25 MG (HYZAAR) 100-25 MG PER TABLET    1 po qd    METOPROLOL SUCCINATE (TOPROL-XL) 200 MG 24 HR TABLET    metoprolol succinate  mg tablet,extended release 24 hr    OMEGA 3-DHA-EPA-FISH -160-1,000 MG CAP    Fish Oil   1200mg 2bid    UMECLIDINIUM-VILANTEROL (ANORO ELLIPTA) 62.5-25 MCG/ACTUATION DSDV    Anoro Ellipta 62.5 mcg-25 mcg/actuation powder for inhalation   Discontinued Medications    ALBUTEROL (PROVENTIL/VENTOLIN HFA) 90 MCG/ACTUATION INHALER    ProAir HFA 90 mcg/actuation aerosol inhaler    ALBUTEROL (VENTOLIN HFA) 90 MCG/ACTUATION INHALER    Inhale 2 puffs into the lungs every 6 (six) hours as needed for Wheezing. Rescue    ASPIRIN (ECOTRIN) 81 MG EC TABLET    Take 81 mg by mouth once daily.    AZELASTINE (ASTELIN) 137 MCG (0.1 %) NASAL SPRAY    azelastine 137 mcg (0.1 %) nasal spray aerosol    BUDESONIDE-FORMOTEROL 160-4.5 MCG (SYMBICORT) 160-4.5 MCG/ACTUATION HFAA    Symbicort 160 mcg-4.5 mcg/actuation HFA aerosol inhaler   TAKE 2 PUFFS BY MOUTH TWICE DAILY    CITALOPRAM (CELEXA) 10 MG TABLET    Take 1 tablet (10 mg total) by mouth once daily.    CLONIDINE (CATAPRES) 0.1 MG TABLET    TAKE 1 TABLET BY MOUTH EVERY 6 HOURS AS NEEDED FOR BLOOD PRESSURE >165/95    DIPHENOXYLATE-ATROPINE 2.5-0.025 MG (LOMOTIL) 2.5-0.025 MG PER TABLET    TAKE 1 TO 2 TABS BY MOUTH EVERY 6 HOURS AS NEEDED    IPRATROPIUM (ATROVENT) 42 MCG (0.06 %) NASAL SPRAY    ipratropium bromide 42 mcg (0.06 %) nasal spray    METHYLPREDNISOLONE (MEDROL DOSEPACK) 4 MG TABLET    use as directed    METHYLPREDNISOLONE (MEDROL DOSEPACK) 4 MG TABLET    use as directed         Vital Signs:  /68 (BP Location: Left arm, Patient Position: Sitting)   Pulse 76   Ht 5' 11" (1.803 m)   Wt 103.1 kg (227 lb 6.4 oz)   SpO2 96%   BMI 31.72 kg/m²        Physical Exam  Constitutional:       General: He is not in acute distress.     Appearance: Normal appearance. He " is well-developed. He is not ill-appearing or toxic-appearing.   HENT:      Head: Normocephalic and atraumatic.      Nose: Nose normal.      Mouth/Throat:      Mouth: Mucous membranes are moist.      Pharynx: Oropharynx is clear. No oropharyngeal exudate or posterior oropharyngeal erythema.   Eyes:      General: Lids are normal. No scleral icterus.        Right eye: No discharge.         Left eye: No discharge.      Conjunctiva/sclera: Conjunctivae normal.   Cardiovascular:      Rate and Rhythm: Normal rate and regular rhythm.      Pulses:           Radial pulses are 2+ on the right side and 2+ on the left side.   Pulmonary:      Effort: Pulmonary effort is normal. No respiratory distress.      Breath sounds: No stridor. No wheezing.   Abdominal:      General: Bowel sounds are normal. There is no distension.      Palpations: Abdomen is soft. There is no fluid wave, hepatomegaly, splenomegaly or mass.      Tenderness: There is no abdominal tenderness. There is no guarding or rebound.   Musculoskeletal:      Cervical back: Full passive range of motion without pain.   Lymphadenopathy:      Cervical: No cervical adenopathy.   Skin:     General: Skin is warm and dry.      Capillary Refill: Capillary refill takes less than 2 seconds.      Coloration: Skin is not cyanotic, jaundiced or pale.   Neurological:      General: No focal deficit present.      Mental Status: He is alert and oriented to person, place, and time.   Psychiatric:         Mood and Affect: Mood normal.         Behavior: Behavior is cooperative.            All of the data above and below has been reviewed by myself and any further interpretations will be reflected in the assessment and plan.   The data includes review of external notes, and independent interpretation of lab results, procedures, x-rays, and imaging reports.      Assessment:  Gastric intestinal metaplasia  Comments:  h/o GIM. Gastric mapping 2018-nl    Rectal bleeding    History of colon  polyps  -     Ambulatory Referral to External Surgery  -     sod sulf-pot chloride-mag sulf (SUTAB) 1.479-0.188- 0.225 gram tablet; Take according to package instructions with indicated amount of water. No breakfast day before test. May substitute with Suprep, Clenpiq, Plenvu, Moviprep or GoLytely based on Rx plan and patient preference.  Dispense: 24 tablet; Refill: 0  -     CBC Auto Differential; Future; Expected date: 06/26/2024    Current use of long term anticoagulation    Screening for malignant neoplasm of colon  -     Ambulatory Referral to External Surgery  -     sod sulf-pot chloride-mag sulf (SUTAB) 1.479-0.188- 0.225 gram tablet; Take according to package instructions with indicated amount of water. No breakfast day before test. May substitute with Suprep, Clenpiq, Plenvu, Moviprep or GoLytely based on Rx plan and patient preference.  Dispense: 24 tablet; Refill: 0  -     CBC Auto Differential; Future; Expected date: 06/26/2024    Rectal bleeding- first episode. Diverticular v hemorrhoid v outlet bleeding.  Likely due to recent constipation.  Will plan on colonoscopy with clearance to hold Eliquis.  Discussed with patient that if he continues to have large amount of bright red blood he needs to present to the ER.  May go on clear liquid diet for today and advance to low residue once bleeding subsides.  will discuss with Dr. Kinney about timing of procedures.  We will obtain CBC today to evaluate blood count. He has no history of anemia per patient.     Recommendations:    Schedule colonoscopy with Dr. Kinney.  Stop Eliquis for 2 days before procedure.  Submit blood test.   If you start having increase in rectal bleeding, dizziness, or shortness or breath. Present to ER.      Risks, benefits, and alternatives of medical management, any associated procedures, and/or treatment discussed with the patient. Patient given opportunity to ask questions and voices understanding. Patient has elected to proceed with  the recommended care modalities as discussed.    Follow up if symptoms worsen or fail to improve.    Order summary:  Orders Placed This Encounter   Procedures    CBC Auto Differential    Ambulatory Referral to External Surgery        Instructed patient to notify my office if they have not been contacted within two weeks after any procedures, submitting any samples (biopsies, blood, stool, urine, etc.) or after any imaging (X-ray, CT, MRI, etc.).      Mayra Barraza NP    This document may have been created using a voice recognition transcribing system. Incorrect words or phrases may have been missed during proofreading. Please interpret accordingly or contact me for clarification.

## 2024-06-26 ENCOUNTER — OFFICE VISIT (OUTPATIENT)
Dept: GASTROENTEROLOGY | Facility: CLINIC | Age: 79
End: 2024-06-26
Payer: MEDICARE

## 2024-06-26 ENCOUNTER — TELEPHONE (OUTPATIENT)
Dept: GASTROENTEROLOGY | Facility: CLINIC | Age: 79
End: 2024-06-26

## 2024-06-26 VITALS
HEART RATE: 76 BPM | BODY MASS INDEX: 31.83 KG/M2 | SYSTOLIC BLOOD PRESSURE: 105 MMHG | DIASTOLIC BLOOD PRESSURE: 68 MMHG | HEIGHT: 71 IN | OXYGEN SATURATION: 96 % | WEIGHT: 227.38 LBS

## 2024-06-26 DIAGNOSIS — K31.A0 GASTRIC INTESTINAL METAPLASIA: Primary | ICD-10-CM

## 2024-06-26 DIAGNOSIS — Z12.11 SCREENING FOR MALIGNANT NEOPLASM OF COLON: ICD-10-CM

## 2024-06-26 DIAGNOSIS — K62.5 RECTAL BLEEDING: ICD-10-CM

## 2024-06-26 DIAGNOSIS — Z79.01 CURRENT USE OF LONG TERM ANTICOAGULATION: ICD-10-CM

## 2024-06-26 DIAGNOSIS — Z86.010 HISTORY OF COLON POLYPS: ICD-10-CM

## 2024-06-26 LAB
ABS NRBC COUNT: 0 THOU/UL (ref 0–0.01)
ABSOLUTE BASOPHIL: 0.1 10*3/UL (ref 0–0.3)
ABSOLUTE EOSINOPHIL: 0.3 10*3/UL (ref 0–0.6)
ABSOLUTE IMMATURE GRAN: 0.02 THOU/UL (ref 0–0.03)
ABSOLUTE LYMPHOCYTE: 1.7 10*3/UL (ref 1.2–4)
ABSOLUTE MONOCYTE: 0.8 10*3/UL (ref 0.1–0.8)
BASOPHILS NFR BLD: 1.6 % (ref 0–3)
EOSINOPHIL NFR BLD: 4.4 % (ref 0–6)
ERYTHROCYTE [DISTWIDTH] IN BLOOD BY AUTOMATED COUNT: 12.5 % (ref 0–15.5)
HCT VFR BLD AUTO: 45.6 % (ref 42–52)
HGB BLD-MCNC: 15.7 G/DL (ref 14–18)
IMMATURE GRANULOCYTES: 0.3 % (ref 0–0.43)
LYMPHOCYTES NFR BLD: 26.3 % (ref 20–45)
MCH RBC QN AUTO: 32.7 PG (ref 27–32)
MCHC RBC AUTO-ENTMCNC: 34.4 % (ref 32–36)
MCV RBC AUTO: 95 FL (ref 80–97)
MONOCYTES NFR BLD: 13.1 % (ref 2–10)
NEUTROPHILS # BLD AUTO: 3.5 10*3/UL (ref 1.4–7)
NEUTROPHILS NFR BLD: 54.3 % (ref 50–80)
NUCLEATED RED BLOOD CELLS: 0 % (ref 0–0.2)
PLATELETS: 230 10*3/UL (ref 130–400)
PMV BLD AUTO: 10.2 FL (ref 9.2–12.2)
RBC # BLD AUTO: 4.8 10*6/UL (ref 4.7–6.1)
WBC # BLD: 6.4 10*3/UL (ref 4.5–10)

## 2024-06-26 PROCEDURE — 3288F FALL RISK ASSESSMENT DOCD: CPT | Mod: CPTII,S$GLB,, | Performed by: NURSE PRACTITIONER

## 2024-06-26 PROCEDURE — 1101F PT FALLS ASSESS-DOCD LE1/YR: CPT | Mod: CPTII,S$GLB,, | Performed by: NURSE PRACTITIONER

## 2024-06-26 PROCEDURE — 3078F DIAST BP <80 MM HG: CPT | Mod: CPTII,S$GLB,, | Performed by: NURSE PRACTITIONER

## 2024-06-26 PROCEDURE — 1160F RVW MEDS BY RX/DR IN RCRD: CPT | Mod: CPTII,S$GLB,, | Performed by: NURSE PRACTITIONER

## 2024-06-26 PROCEDURE — 1159F MED LIST DOCD IN RCRD: CPT | Mod: CPTII,S$GLB,, | Performed by: NURSE PRACTITIONER

## 2024-06-26 PROCEDURE — 99204 OFFICE O/P NEW MOD 45 MIN: CPT | Mod: S$GLB,,, | Performed by: NURSE PRACTITIONER

## 2024-06-26 PROCEDURE — 3074F SYST BP LT 130 MM HG: CPT | Mod: CPTII,S$GLB,, | Performed by: NURSE PRACTITIONER

## 2024-06-26 RX ORDER — SOD SULF/POT CHLORIDE/MAG SULF 1.479 G
TABLET ORAL
Qty: 24 TABLET | Refills: 0 | Status: SHIPPED | OUTPATIENT
Start: 2024-06-26

## 2024-06-26 NOTE — TELEPHONE ENCOUNTER
6/26/2024 CBC-nl    Call with results. Let him know his blood counts were in the normal range.  NEIL

## 2024-06-26 NOTE — Clinical Note
Not the most pleasant patient. States they will go to [place] if they can't have colonoscopy soon. Tried to reassure that bleeding likely not acute but to go to ER if continues. Told them we have to wait for clearance either way.

## 2024-06-26 NOTE — LETTER
June 26, 2024        Nelson Fuentes MD  [address]  [facility] [place] [address]             [place] [facility]  [address] [address]  Phone: [phone]  Fax: [phone]   Patient: Heather Andrea   MR Number: [id number]   YOB: 1945   Date of Visit: 6/26/2024     Dear Dr. Fuentes,     I am writing to you for some assistance with a patient that is currently under your care. Heather Andrea  1945  has seen Dr. Kinney and needs to be scheduled for a colonsocopy. Ideally, the patient would hold the Eliquis for 2 days in the event that interventions are needed (dilation, polypectomy, biopsies, etc.). Since Dr. Kinney is not managing this medication, we would like your assistance in approving the above request. Please fax response to [phone]. Your response is greatly appreciated and will assist us in providing optimal patient care. Should you have any questions or concerns, please do not hesitate to contact me at [phone].           Sincerely,      Mayra Barraza FNP            CC  No Recipients    Enclosure

## 2024-06-26 NOTE — PATIENT INSTRUCTIONS
Schedule colonoscopy with Dr. Kinney.  Stop Eliquis for 2 days before procedure.  Submit blood test.   If you start having increase in rectal bleeding, dizziness, or shortness or breath. Present to ER.    Please notify my office if you have not been contacted within two weeks after any procedures, submitting any samples (biopsies, blood, stool, urine, etc.) or after any imaging (X-ray, CT, MRI, etc.).

## 2024-06-27 NOTE — TELEPHONE ENCOUNTER
Staff called to let pt know procedure date available 8-15-24.. 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 he's asking if he can do the beginning of Aug before school start?      --ronak

## 2024-06-27 NOTE — TELEPHONE ENCOUNTER
Also, okay to add on to 8/15/2024 Thursday to procedure schedule. Will need Foster last OV note and clearance.   NBP

## 2024-06-27 NOTE — TELEPHONE ENCOUNTER
6-26-24  Clearance faxed to Dr Fuentes  [phone] to hold Eliquis for 2 days prior to procedure....  --ronak

## 2024-06-27 NOTE — TELEPHONE ENCOUNTER
Returned call to pt. Gave results of b/w. I let him know that we are currently awaiting a response from Dr. Kinney regarding the scheduling of his scope before 8/15. -kg         Message  Received: Today   Pt Advice, Appointment Access  Liliana Chavira Staff  Caller: self (Today,  9:00 AM)  Type:  Patient Returning Call    Who Called:Heather Elmoremier  Who Left Message for Patient:vaibhav  Does the patient know what this is regarding?:results,upcoming appt  Would the patient rather a call back or a response via [facility]?   Best Call Back Number:[phone]  Additional Information: n/a

## 2024-06-28 ENCOUNTER — TELEPHONE (OUTPATIENT)
Dept: GASTROENTEROLOGY | Facility: CLINIC | Age: 79
End: 2024-06-28
Payer: MEDICARE

## 2024-06-28 NOTE — TELEPHONE ENCOUNTER
Patient called to check status of his colonoscopy moved up. I let patient know message was sent to Dr. Kinney and we are waiting on a message from her to move his procedure up or not. 6/28/24 LRA     ----- Message from Yesica Ascencio sent at 6/28/2024 12:44 PM CDT -----  Contact: Patient  Patient called to consult with nurse or staff regarding a missed call about an appointment. He would like a call back and can be reached at [phone]. Thanks/MR

## 2024-06-28 NOTE — TELEPHONE ENCOUNTER
Lvm to speak to patient. 6/28/24 LRA       ----- Message from Liliana Chavira sent at 6/28/2024 10:33 AM CDT -----  Contact: self  Type:  Patient Returning Call    Who Called:Heather Andrea  Who Left Message for Patient:unsure  Does the patient know what this is regarding?:upcoming appt  Would the patient rather a call back or a response via [facility]?   Best Call Back Number:[phone]  Additional Information: colonoscopy

## 2024-07-02 ENCOUNTER — TELEPHONE (OUTPATIENT)
Dept: GASTROENTEROLOGY | Facility: CLINIC | Age: 79
End: 2024-07-02
Payer: MEDICARE

## 2024-07-02 NOTE — TELEPHONE ENCOUNTER
Patient called stating he received a phone call thinking he was scheduled for procedure on August 15th but was not sure of date and/or time. I informed patient that he can be scheduled on August 15th and that the hospital will be contacting him on Wednesday, August 14th to give him his arrival time. Patient stated he had his colonoscopy paperwork from our office with him prep instructions and would contact us if he had any other questions. BF

## 2024-07-02 NOTE — TELEPHONE ENCOUNTER
----- Message from Bisi Don sent at 7/2/2024 11:24 AM CDT -----  Contact: pt  Pt returning a call and can be reached at [phone]     Thanks,

## 2024-07-03 NOTE — TELEPHONE ENCOUNTER
Rec'd cardiac clearance 7/2/24 and OV notes 12/4/23 from Dr. Fuentes. Pt may hold Eliquis 2-3 days before the procedure. Scanned docs into media and routed to [facility]OHAIB & MCKAY. jayy

## 2024-08-09 ENCOUNTER — TELEPHONE (OUTPATIENT)
Dept: GASTROENTEROLOGY | Facility: CLINIC | Age: 79
End: 2024-08-09
Payer: MEDICARE

## 2024-08-09 VITALS — BODY MASS INDEX: 31.78 KG/M2 | HEIGHT: 71 IN | WEIGHT: 227 LBS

## 2024-08-09 DIAGNOSIS — Z86.010 HISTORY OF COLON POLYPS: Primary | ICD-10-CM

## 2024-08-09 DIAGNOSIS — Z12.11 SCREENING FOR MALIGNANT NEOPLASM OF COLON: ICD-10-CM

## 2024-08-15 ENCOUNTER — OUTSIDE PLACE OF SERVICE (OUTPATIENT)
Dept: GASTROENTEROLOGY | Facility: CLINIC | Age: 79
End: 2024-08-15

## 2024-08-15 LAB — CRC RECOMMENDATION EXT: NORMAL

## 2024-08-20 ENCOUNTER — TELEPHONE (OUTPATIENT)
Dept: GASTROENTEROLOGY | Facility: CLINIC | Age: 79
End: 2024-08-20
Payer: MEDICARE

## 2024-08-21 NOTE — TELEPHONE ENCOUNTER
1 TA, 1 hyp, repeat colonoscopy in 5 years.     Notify patient. Confirm recall tab in appointment desk is up-to-date (update if needed).  NBP

## 2024-08-22 NOTE — TELEPHONE ENCOUNTER
Called and spoke with patient and went over his results and he understood and its updated in the recall tab .  c

## 2024-09-12 ENCOUNTER — DOCUMENTATION ONLY (OUTPATIENT)
Dept: GASTROENTEROLOGY | Facility: CLINIC | Age: 79
End: 2024-09-12
Payer: MEDICARE